# Patient Record
Sex: FEMALE | Race: WHITE
[De-identification: names, ages, dates, MRNs, and addresses within clinical notes are randomized per-mention and may not be internally consistent; named-entity substitution may affect disease eponyms.]

---

## 2019-07-05 ENCOUNTER — HOSPITAL ENCOUNTER (EMERGENCY)
Dept: HOSPITAL 53 - M ED | Age: 64
Discharge: HOME | End: 2019-07-05
Payer: COMMERCIAL

## 2019-07-05 VITALS — HEIGHT: 67 IN | WEIGHT: 179.02 LBS | BODY MASS INDEX: 28.1 KG/M2

## 2019-07-05 VITALS — DIASTOLIC BLOOD PRESSURE: 69 MMHG | SYSTOLIC BLOOD PRESSURE: 115 MMHG

## 2019-07-05 DIAGNOSIS — J45.909: ICD-10-CM

## 2019-07-05 DIAGNOSIS — J20.9: Primary | ICD-10-CM

## 2019-07-05 DIAGNOSIS — Z87.891: ICD-10-CM

## 2019-07-05 DIAGNOSIS — Z79.899: ICD-10-CM

## 2019-07-05 DIAGNOSIS — Z88.6: ICD-10-CM

## 2019-07-05 DIAGNOSIS — Z88.5: ICD-10-CM

## 2019-07-05 DIAGNOSIS — I10: ICD-10-CM

## 2019-07-05 NOTE — REP
Clinical:  Cough and wheezing with shortness of breath.

 

Technique:  PA and lateral.

 

Comparison:  None.

 

Findings:

Mediastinum and cardiac silhouette are within normal limits.  Chronic scoliosis

noted.  The lung fields are clear without acute consolidation, effusion, or

pneumothorax.

 

Impression:

No focal consolidation.

 

 

Electronically Signed by

Kody Jones MD 07/05/2019 11:34 A